# Patient Record
Sex: FEMALE | Race: WHITE
[De-identification: names, ages, dates, MRNs, and addresses within clinical notes are randomized per-mention and may not be internally consistent; named-entity substitution may affect disease eponyms.]

---

## 2022-01-12 ENCOUNTER — HOSPITAL ENCOUNTER (OUTPATIENT)
Dept: HOSPITAL 79 - US | Age: 87
End: 2022-01-12
Payer: MEDICARE

## 2022-01-12 DIAGNOSIS — Z01.89: Primary | ICD-10-CM

## 2022-01-14 ENCOUNTER — HOSPITAL ENCOUNTER (INPATIENT)
Dept: HOSPITAL 79 - ER1 | Age: 87
LOS: 3 days | Discharge: HOME | DRG: 682 | End: 2022-01-17
Attending: INTERNAL MEDICINE | Admitting: INTERNAL MEDICINE
Payer: MEDICARE

## 2022-01-14 VITALS — HEIGHT: 62 IN | BODY MASS INDEX: 22.08 KG/M2 | WEIGHT: 120 LBS

## 2022-01-14 DIAGNOSIS — I73.9: ICD-10-CM

## 2022-01-14 DIAGNOSIS — E11.22: ICD-10-CM

## 2022-01-14 DIAGNOSIS — Z83.438: ICD-10-CM

## 2022-01-14 DIAGNOSIS — J96.01: ICD-10-CM

## 2022-01-14 DIAGNOSIS — J18.9: ICD-10-CM

## 2022-01-14 DIAGNOSIS — Z79.82: ICD-10-CM

## 2022-01-14 DIAGNOSIS — Z86.73: ICD-10-CM

## 2022-01-14 DIAGNOSIS — E11.51: ICD-10-CM

## 2022-01-14 DIAGNOSIS — N17.9: Primary | ICD-10-CM

## 2022-01-14 DIAGNOSIS — Z66: ICD-10-CM

## 2022-01-14 DIAGNOSIS — S92.321A: ICD-10-CM

## 2022-01-14 DIAGNOSIS — S92.331A: ICD-10-CM

## 2022-01-14 DIAGNOSIS — E86.0: ICD-10-CM

## 2022-01-14 DIAGNOSIS — E03.9: ICD-10-CM

## 2022-01-14 DIAGNOSIS — Z98.890: ICD-10-CM

## 2022-01-14 DIAGNOSIS — N18.30: ICD-10-CM

## 2022-01-14 DIAGNOSIS — N39.0: ICD-10-CM

## 2022-01-14 DIAGNOSIS — I65.22: ICD-10-CM

## 2022-01-14 DIAGNOSIS — Z79.899: ICD-10-CM

## 2022-01-14 DIAGNOSIS — E11.649: ICD-10-CM

## 2022-01-14 DIAGNOSIS — Z20.822: ICD-10-CM

## 2022-01-14 DIAGNOSIS — E11.40: ICD-10-CM

## 2022-01-14 LAB
BUN/CREATININE RATIO: 18 (ref 0–10)
HGB BLD-MCNC: 10.9 GM/DL (ref 12.3–15.3)
RED BLOOD COUNT: 3.74 M/UL (ref 4–5.1)
WHITE BLOOD COUNT: 7.7 K/UL (ref 4.5–11)

## 2022-01-14 PROCEDURE — U0002 COVID-19 LAB TEST NON-CDC: HCPCS

## 2022-01-15 LAB
BUN/CREATININE RATIO: 17 (ref 0–10)
HGB BLD-MCNC: 10 GM/DL (ref 12.3–15.3)
RED BLOOD COUNT: 3.49 M/UL (ref 4–5.1)
WHITE BLOOD COUNT: 6.5 K/UL (ref 4.5–11)

## 2022-01-16 LAB
BUN/CREATININE RATIO: 13 (ref 0–10)
HGB BLD-MCNC: 9.6 GM/DL (ref 12.3–15.3)
RED BLOOD COUNT: 3.31 M/UL (ref 4–5.1)
WHITE BLOOD COUNT: 6 K/UL (ref 4.5–11)

## 2022-01-16 NOTE — NUR
BILAT FEET DRESSINGS CHANGED NOW. SILVADENE OINTMENT APPLIED AS WELL AS
NON-ADHERANTS AND KERLIX WITH TAPE TO HOLD PLACED AS BEFORE. WCTM. PT
REQUESTED TO HAVE OINTMENT APPLIED AND DRESSING CHANGED Imaging Studies/Labs

## 2022-02-09 ENCOUNTER — HOSPITAL ENCOUNTER (OUTPATIENT)
Dept: HOSPITAL 79 - KOH-I | Age: 87
End: 2022-02-09
Attending: NURSE PRACTITIONER
Payer: MEDICARE

## 2022-02-09 DIAGNOSIS — R29.810: Primary | ICD-10-CM

## 2022-02-09 DIAGNOSIS — R41.3: ICD-10-CM

## 2022-02-09 DIAGNOSIS — R29.3: ICD-10-CM
